# Patient Record
(demographics unavailable — no encounter records)

---

## 2025-03-20 NOTE — DI.ECHO.S_ITS
[facility] 
+---------+                        [facility] 
:         :                      [address] 
:         :                      [place] 
:         :                          [id number] 
:         :                       Phone: [phone] 
[phone]                        [phone] 
                             Echocardiogram Report 
+-----------------------------------------------------------------------------+ 
:Name: MALENA SEAMAN     Study Date: 2025         Height: 65.5 in: 
:Hospital MRN #: [id number]     ReadingLocation:               Weight: 140 lb : 
:Account #: [id number]          Gender: Female                 BSA: 1.7 m2    : 
:: 1965                Age: 59 yrs                    BP: 135/88 mmHg: 
:Reason For Study: CORONARY ARTERY DISEASE                                    : 
:Ordering Physician: MARY                                                   : 
:ANGIE                       Performed By: Isabella Anton                    : 
:Referring: ANGIE HERNANDEZ                                                   : 
+-----------------------------------------------------------------------------+ 
Interpretation Summary 
Normal sinus rhythm. 
  
Normal LV size and wall thickness. There is basal inferolateral, mid- 
inferolateral, mid inferior, mid-inferoseptal akinesis. These observations are 
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 Otherwise normal wall 
motion throughout. Ejection fraction is 50-55 %. Stage I diastolic 
dysfunction. 
  
Normal chamber sizes. 
  
No significant valvular abnormalities. 
  
Estimated PA systolic pressure is 22 mmHg assuming right atrial pressure of 3 
mmHg. 
  
No prior study available for comparison. 
  
Procedure:   A two-dimensional transthoracic echocardiogram with color flow 
and Doppler was performed. The study quality was technically adequate. There 
is no prior echocardiogram noted for this patient. The patient was in sinus 
rhythm with heart rates between 58-70 bpm during the exam. 
Left Ventricle:   The left ventricle is normal in size and wall thickness. The 
ejection fraction is estimated to be 50-55%. 
Right Ventricle:   The right ventricle is normal in size and function. 
Atria:   The left atrial size is normal. Right atrial size is normal. There is 
no Doppler evidence for an interatrial shunt. 
Mitral Valve:   The mitral valve leaflets appear to open well. There is trace 
mitral regurgitation. 
Aortic Valve:   The aortic valve is trileaflet. The aortic valve is mildly 
calcified. There is no aortic valve stenosis. No aortic regurgitation is 
present. 
Tricuspid Valve:   The tricuspid valve leaflets are thin and pliable. There is 
mild tricuspid regurgitation. The right ventricular systolic pressure is 
estimated to be at least 22 mmHg based on an estimated right atrial pressure 
of 3 mm Hg. 
Pulmonic Valve:   The pulmonic valve leaflets are thin and pliable; valve 
motion is normal. There is mild pulmonic regurgitation. 
Great Vessels:   The aortic root is normal size. The dimensions of the 
ascending aorta are normal. The IVC is of normal diameter and collapses 
greater than 50% with a sniff. This suggests a low right atrial pressure of 3 
mm Hg. 
Pericardium/ Pleura   There is no pericardial effusion. There is no pleural 
effusion. 
  
MMode/2D Measurements & Calculations 
LVIDd: 5.5 cm                            LVOT diam: 2.0 cm 
LVIDs: 4.6 cm                            Ao root diam: 3.1 cm 
FS: 17.9 %                               asc Aorta Diam: 3.3 cm 
EPSS: 0.54 cm                            Ao Arch Diam (Prox Trans): 2.7 cm 
IVSd: 0.91 cm 
LVPWd: 0.61 cm 
LV english. diameter/BSA (cm/m^2): 3.2 
LV sys. diameter/BSA (cm/m^2): 2.7 
        
______________________________________________________________________________ 
LA A2 area: 17.8 cm2                     RA long axis: 4.5 cm 
LA A4 area: 13.8 cm2                     RA area: 14.4 cm2 
LA length (vol): 4.3 cm                  RA vol: 39.4 ml 
LA vol: 48.6 ml                          RA VI: 23.0 ml/m2 
LA vol index: 28.4 ml/m2                 IVC diam: 1.1 cm 
  
        
______________________________________________________________________________ 
RVD1 (basal): 3.1 cm 
RVD2 (mid): 2.6 cm 
TAPSE: 2.2 cm 
  
Doppler Measurements & Calculations 
Ao V2 max: 137.2 cm/sec            LVOT Max Jourdan: 116.1 cm/sec 
Ao V2 mean: 90.7 cm/sec            LV V1 max P.4 mmHg 
Ao max P.5 mmHg                LV V1 VTI: 24.8 cm 
Ao mean PG: 3.7 mmHg               ESTEBAN(I,D): 2.7 cm2 
Ao V2 VTI: 30.2 cm                 ESTEBAN(V,D): 2.8 cm2 
                                   AS sev ratio: 0.82 
                                   ESTEBAN indexed to BSA (cm^2/m^2): 1.6 
        
______________________________________________________________________________ 
MV E max jourdan: 78.0 cm/sec          TR max jourdan: 219.1 cm/sec 
MV A max jourdan: 84.2 cm/sec          TR max P.2 mmHg 
MV E/A: 0.93                       PA V2 max: 94.4 cm/sec 
Med Peak E' Jourdan: 4.6 cm/sec        PA V2 mean: 60.8 cm/sec 
E/E' med: 16.9                     PA mean P.7 mmHg 
Lat Peak E' Jourdan: 6.3 cm/sec        PA pr(Accel): 32.8 mmHg 
E/E' lat: 12.3 
E/e' average: 14.6 
MV dec time: 0.19 sec 
  
        
______________________________________________________________________________ 
Pulm A Revs Jourdan: 24.1 cm/sec       SV(LVOT): 81.6 ml 
Pulm A Revs Dur: 0.08 sec 
  
______________________________________________________________________________ 
                  Electronically signed by: Sunita Anderson M.D. on 
Reading Physician:2025 10:54 AM